# Patient Record
Sex: MALE | Race: OTHER
[De-identification: names, ages, dates, MRNs, and addresses within clinical notes are randomized per-mention and may not be internally consistent; named-entity substitution may affect disease eponyms.]

---

## 2019-03-20 ENCOUNTER — HOSPITAL ENCOUNTER (EMERGENCY)
Dept: HOSPITAL 80 - FED | Age: 39
Discharge: HOME | End: 2019-03-20
Payer: COMMERCIAL

## 2019-03-20 VITALS — SYSTOLIC BLOOD PRESSURE: 146 MMHG | DIASTOLIC BLOOD PRESSURE: 85 MMHG
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
## 2019-03-20 DIAGNOSIS — S82.55XA: Primary | ICD-10-CM

## 2019-03-20 PROCEDURE — L4386 NON-PNEUM WALK BOOT PRE CST: HCPCS

## 2019-03-20 NOTE — EDPHY
H & P


Time Seen by Provider: 03/20/19 13:02


HPI/ROS: 





CHIEF COMPLAINT:  Left leg injury





HISTORY OF PRESENT ILLNESS:  Patient had plywood fall and his leg at work last 

week on Thursday.  He has had swelling since with pain worse on walking.  

Located on the lateral aspect of his lower leg on on both sides of his ankle.





REVIEW OF SYSTEMS:  No weakness or numbness distally, bruising on the ankle but 

no laceration.  No fever or chills.  No symptoms above the proximal tib-fib.





PAST MEDICAL HISTORY:  Previous right hand surgery





Social history:  History and physical with  personally 

present in the room





General Appearance: Alert and conversant, cooperative.


Normal range of motion of the knee, no effusion, stable.


Proximal tib-fib tenderness on the lateral side with abrasion on the lateral 

surface.  Compartments are soft.


Lateral and malleolar ankle tenderness with bruising on the medial side of left 

ankle.  Achilles nontender.  Foot nontender.


No laceration, no redness warmth or lymphangitis.


No tenderness in the foot.  Normal motor sensory and dorsalis pedis pulse there.





Emergency Department course/MDM:





Patient took 800 mg ibuprofen 3 hr ago.  X-ray of the left tib-fib and ankle 

ordered.





X-ray shows nondisplaced medial malleolar fracture.


Ortho boot crutches and nonweightbearing.  Orthopedic follow-up mandatory.


Smoking Status: Current some day smoker


Constitutional: 


 Initial Vital Signs











Temperature (C)  37.2 C   03/20/19 12:21


 


Heart Rate  77   03/20/19 12:21


 


Respiratory Rate  16   03/20/19 12:21


 


Blood Pressure  133/78 H  03/20/19 12:21


 


O2 Sat (%)  96   03/20/19 12:21








 











O2 Delivery Mode               Room Air














Allergies/Adverse Reactions: 


 





No Known Allergies Allergy (Unverified 03/20/19 12:30)


 








Home Medications: 














 Medication  Instructions  Recorded


 


oxyCODONE/APAP 5/325 [Percocet] 1 - 2 tab PO Q4-6PRN PRN #11 tab 03/20/19














MDM/Departure





- MDM


Imaging Results: 


 Imaging Impressions





Ankle X-Ray  03/20/19 12:53


Impression: Transversely-oriented fracture involving the medial malleolus, and 

an old avulsion fragment (versus unfused ossification center) distal to the 

lateral malleolus.


 


 


LEFT ANKLE (3 Views, at 1:15 PM): The transversely-oriented fracture involving 

the medial malleolus is seen in greater detail, and is still relatively anatomic

, with no mortise disruption. There are also a couple punctate avulsion 

fragments along the undersurface of the medial malleolus. There is an old 

unfused well-corticated secondary ossification center versus old avulsion 

fragment distal to the lateral malleolus. There is soft tissue swelling 

involving the ankle, medial greater than lateral. The talar dome is well-

contoured. There is some mild osseous protuberance along the talonavicular 

joint. Tiny posterior calcaneal enthesophytes are seen.


 


Impression: Transversely-oriented fracture involving the medial malleolus, with 

no mortise disruption.








Tibia/Fibula X-Ray  03/20/19 13:05


Impression: Transversely-oriented fracture involving the medial malleolus, and 

an old avulsion fragment (versus unfused ossification center) distal to the 

lateral malleolus.


 


 


LEFT ANKLE (3 Views, at 1:15 PM): The transversely-oriented fracture involving 

the medial malleolus is seen in greater detail, and is still relatively anatomic

, with no mortise disruption. There are also a couple punctate avulsion 

fragments along the undersurface of the medial malleolus. There is an old 

unfused well-corticated secondary ossification center versus old avulsion 

fragment distal to the lateral malleolus. There is soft tissue swelling 

involving the ankle, medial greater than lateral. The talar dome is well-

contoured. There is some mild osseous protuberance along the talonavicular 

joint. Tiny posterior calcaneal enthesophytes are seen.


 


Impression: Transversely-oriented fracture involving the medial malleolus, with 

no mortise disruption.











Imaging: I viewed and interpreted images myself





- Depart


Disposition: Home, Routine, Self-Care


Clinical Impression: 


Fracture of medial malleolus, left, closed


Qualifiers:


 Encounter type: initial encounter Fracture alignment: nondisplaced Qualified 

Code(s): S82.55XA - Nondisplaced fracture of medial malleolus of left tibia, 

initial encounter for closed fracture





Condition: Good


Instructions:  Ankle Fracture (ED), Crutch Instructions (ED)


Additional Instructions: 


Use crutches as needed, follow up orthopedic clinic tomorrow or Friday.


Crutches with no walking or standing on the right leg.  Wear boot at all times.








Use las muletas jarad sea necesario, seguimiento con la Ortopedica manana o el 

Viernes.


Muletas sin caminar o al ponerse de pie en la pierna derecha. Use la bota todo 

el tiempo.


Prescriptions: 


oxyCODONE/APAP 5/325 [Percocet] 1 - 2 tab PO Q4-6PRN PRN #11 tab


 PRN Reason: Pain


Referrals: 


Kusum Truong [Primary Care Provider] - As per Instructions


Brandan Canseco MD [Medical Doctor] - As per Instructions (Follow-up with 

Orthopedics for your ankle fracture in the next 24-48 hours.)


Print Language: Greek